# Patient Record
Sex: MALE | Race: WHITE | NOT HISPANIC OR LATINO
[De-identification: names, ages, dates, MRNs, and addresses within clinical notes are randomized per-mention and may not be internally consistent; named-entity substitution may affect disease eponyms.]

---

## 2016-11-15 LAB
AFP-TM SERPL-MCNC: <2 NG/ML (ref 0–8)
HCG SERPL-ACNC: <2 MUNITS/ML (ref 0–5)

## 2017-01-16 ENCOUNTER — CHARTING TRANS (OUTPATIENT)
Dept: OTHER | Age: 27
End: 2017-01-16

## 2017-01-16 ENCOUNTER — IMAGING SERVICES (OUTPATIENT)
Dept: OTHER | Age: 27
End: 2017-01-16

## 2017-01-16 ENCOUNTER — LAB SERVICES (OUTPATIENT)
Dept: OTHER | Age: 27
End: 2017-01-16

## 2017-01-17 ENCOUNTER — CHARTING TRANS (OUTPATIENT)
Dept: OTHER | Age: 27
End: 2017-01-17

## 2017-01-17 LAB
AFP-TM SERPL-MCNC: <2 NG/ML (ref 0–9)
HCG SERPL-ACNC: <2 MUNITS/ML (ref 0–5)

## 2017-02-13 ENCOUNTER — LAB SERVICES (OUTPATIENT)
Dept: OTHER | Age: 27
End: 2017-02-13

## 2017-02-13 ENCOUNTER — CHARTING TRANS (OUTPATIENT)
Dept: OTHER | Age: 27
End: 2017-02-13

## 2017-02-13 ENCOUNTER — IMAGING SERVICES (OUTPATIENT)
Dept: OTHER | Age: 27
End: 2017-02-13

## 2017-02-14 LAB
AFP-TM SERPL-MCNC: 2 NG/ML (ref 0–9)
HCG SERPL-ACNC: <2 MUNITS/ML

## 2017-03-13 ENCOUNTER — IMAGING SERVICES (OUTPATIENT)
Dept: OTHER | Age: 27
End: 2017-03-13

## 2017-03-13 ENCOUNTER — CHARTING TRANS (OUTPATIENT)
Dept: OTHER | Age: 27
End: 2017-03-13

## 2017-03-13 ENCOUNTER — HOSPITAL (OUTPATIENT)
Dept: OTHER | Age: 27
End: 2017-03-13
Attending: FAMILY MEDICINE

## 2017-03-13 ENCOUNTER — LAB SERVICES (OUTPATIENT)
Dept: OTHER | Age: 27
End: 2017-03-13

## 2017-03-14 LAB
AFP-TM SERPL-MCNC: <2 NG/ML (ref 0–9)
ANION GAP SERPL CALC-SCNC: 16 MMOL/L (ref 10–20)
BUN SERPL-MCNC: 12 MG/DL (ref 6–20)
BUN/CREAT SERPL: 13 (ref 7–25)
CALCIUM SERPL-MCNC: 9.5 MG/DL (ref 8.4–10.2)
CHLORIDE SERPL-SCNC: 103 MMOL/L (ref 98–107)
CO2 SERPL-SCNC: 25 MMOL/L (ref 21–32)
CREAT SERPL-MCNC: 0.93 MG/DL (ref 0.67–1.17)
GLUCOSE SERPL-MCNC: 124 MG/DL (ref 65–99)
HCG SERPL-ACNC: <2 MUNITS/ML
LENGTH OF FAST TIME PATIENT: 12 HRS
POTASSIUM SERPL-SCNC: 4.2 MMOL/L (ref 3.4–5.1)
SODIUM SERPL-SCNC: 140 MMOL/L (ref 135–145)

## 2017-04-03 ENCOUNTER — CHARTING TRANS (OUTPATIENT)
Dept: OTHER | Age: 27
End: 2017-04-03

## 2017-04-10 ENCOUNTER — IMAGING SERVICES (OUTPATIENT)
Dept: OTHER | Age: 27
End: 2017-04-10

## 2017-04-10 ENCOUNTER — CHARTING TRANS (OUTPATIENT)
Dept: OTHER | Age: 27
End: 2017-04-10

## 2017-04-10 ENCOUNTER — LAB SERVICES (OUTPATIENT)
Dept: OTHER | Age: 27
End: 2017-04-10

## 2017-04-11 LAB
AFP-TM SERPL-MCNC: <2 NG/ML (ref 0–9)
HCG SERPL-ACNC: <2 MUNITS/ML

## 2017-05-15 ENCOUNTER — LAB SERVICES (OUTPATIENT)
Dept: OTHER | Age: 27
End: 2017-05-15

## 2017-05-15 ENCOUNTER — CHARTING TRANS (OUTPATIENT)
Dept: OTHER | Age: 27
End: 2017-05-15

## 2017-05-15 ENCOUNTER — IMAGING SERVICES (OUTPATIENT)
Dept: OTHER | Age: 27
End: 2017-05-15

## 2017-05-16 LAB
AFP-TM SERPL-MCNC: 2 NG/ML (ref 0–9)
HCG SERPL-ACNC: <2 MUNITS/ML (ref 0–5)

## 2017-06-19 ENCOUNTER — IMAGING SERVICES (OUTPATIENT)
Dept: OTHER | Age: 27
End: 2017-06-19

## 2017-06-19 ENCOUNTER — CHARTING TRANS (OUTPATIENT)
Dept: OTHER | Age: 27
End: 2017-06-19

## 2017-06-19 ENCOUNTER — LAB SERVICES (OUTPATIENT)
Dept: OTHER | Age: 27
End: 2017-06-19

## 2017-06-20 LAB
AFP-TM SERPL-MCNC: 2 NG/ML (ref 0–9)
HCG SERPL-ACNC: <2 MUNITS/ML (ref 0–5)

## 2017-07-24 ENCOUNTER — LAB SERVICES (OUTPATIENT)
Dept: OTHER | Age: 27
End: 2017-07-24

## 2017-07-24 ENCOUNTER — CHARTING TRANS (OUTPATIENT)
Dept: OTHER | Age: 27
End: 2017-07-24

## 2017-07-25 ENCOUNTER — CHARTING TRANS (OUTPATIENT)
Dept: OTHER | Age: 27
End: 2017-07-25

## 2017-07-25 LAB
AFP-TM SERPL-MCNC: 2 NG/ML (ref 0–9)
ALBUMIN SERPL-MCNC: 4.3 G/DL (ref 3.6–5.1)
ALBUMIN/GLOB SERPL: 1.3 (ref 1–2.4)
ALP SERPL-CCNC: 80 UNITS/L (ref 45–117)
ALT SERPL-CCNC: 64 UNITS/L
ANION GAP SERPL CALC-SCNC: 16 MMOL/L (ref 10–20)
AST SERPL-CCNC: 22 UNITS/L
BASOPHILS # BLD: 0.1 K/MCL (ref 0–0.3)
BASOPHILS NFR BLD: 1 %
BILIRUB SERPL-MCNC: 0.6 MG/DL (ref 0.2–1)
BUN SERPL-MCNC: 8 MG/DL (ref 6–20)
BUN/CREAT SERPL: 9 (ref 7–25)
CALCIUM SERPL-MCNC: 9.4 MG/DL (ref 8.4–10.2)
CHLORIDE SERPL-SCNC: 106 MMOL/L (ref 98–107)
CHOLEST SERPL-MCNC: 145 MG/DL
CHOLEST/HDLC SERPL: 4.3
CO2 SERPL-SCNC: 23 MMOL/L (ref 21–32)
CREAT SERPL-MCNC: 0.86 MG/DL (ref 0.67–1.17)
DIFFERENTIAL METHOD BLD: ABNORMAL
EOSINOPHIL # BLD: 0.4 K/MCL (ref 0.1–0.5)
EOSINOPHIL NFR BLD: 4 %
ERYTHROCYTE [DISTWIDTH] IN BLOOD: 12.9 % (ref 11–15)
GLOBULIN SER-MCNC: 3.3 G/DL (ref 2–4)
GLUCOSE SERPL-MCNC: 85 MG/DL (ref 65–99)
HCG SERPL-ACNC: <2 MUNITS/ML (ref 0–5)
HDLC SERPL-MCNC: 34 MG/DL
HEMATOCRIT: 47.9 % (ref 39–51)
HEMOGLOBIN: 16.5 G/DL (ref 13–17)
LDLC SERPL CALC-MCNC: 45 MG/DL
LENGTH OF FAST TIME PATIENT: 12 HRS
LENGTH OF FAST TIME PATIENT: 12 HRS
LYMPHOCYTES # BLD: 3.3 K/MCL (ref 1–4.8)
LYMPHOCYTES NFR BLD: 35 %
MEAN CORPUSCULAR HEMOGLOBIN: 29.9 PG (ref 26–34)
MEAN CORPUSCULAR HGB CONC: 34.4 G/DL (ref 32–36.5)
MEAN CORPUSCULAR VOLUME: 86.8 FL (ref 78–100)
MONOCYTES # BLD: 0.9 K/MCL (ref 0.3–0.9)
MONOCYTES NFR BLD: 10 %
NEUTROPHILS # BLD: 4.6 K/MCL (ref 1.8–7.7)
NEUTROPHILS NFR BLD: 50 %
NONHDLC SERPL-MCNC: 111 MG/DL
PLATELET COUNT: 301 K/MCL (ref 140–450)
POTASSIUM SERPL-SCNC: 4.5 MMOL/L (ref 3.4–5.1)
RED CELL COUNT: 5.52 MIL/MCL (ref 4.5–5.9)
SODIUM SERPL-SCNC: 140 MMOL/L (ref 135–145)
TOTAL PROTEIN: 7.6 G/DL (ref 6.4–8.2)
TRIGL SERPL-MCNC: 331 MG/DL
TSH SERPL-ACNC: 1.47 MCUNITS/ML (ref 0.35–5)
WHITE BLOOD COUNT: 9.2 K/MCL (ref 4.2–11)

## 2017-08-30 ENCOUNTER — IMAGING SERVICES (OUTPATIENT)
Dept: OTHER | Age: 27
End: 2017-08-30

## 2017-08-30 ENCOUNTER — HOSPITAL (OUTPATIENT)
Dept: OTHER | Age: 27
End: 2017-08-30
Attending: FAMILY MEDICINE

## 2017-09-08 ENCOUNTER — LAB SERVICES (OUTPATIENT)
Dept: OTHER | Age: 27
End: 2017-09-08

## 2017-09-08 ENCOUNTER — IMAGING SERVICES (OUTPATIENT)
Dept: OTHER | Age: 27
End: 2017-09-08

## 2017-09-08 ENCOUNTER — CHARTING TRANS (OUTPATIENT)
Dept: OTHER | Age: 27
End: 2017-09-08

## 2017-09-11 LAB
AFP-TM SERPL-MCNC: <2 NG/ML (ref 0–9)
HCG SERPL-ACNC: <2 MUNITS/ML (ref 0–5)

## 2017-11-06 ENCOUNTER — LAB SERVICES (OUTPATIENT)
Dept: OTHER | Age: 27
End: 2017-11-06

## 2017-11-06 ENCOUNTER — CHARTING TRANS (OUTPATIENT)
Dept: OTHER | Age: 27
End: 2017-11-06

## 2017-11-06 ENCOUNTER — IMAGING SERVICES (OUTPATIENT)
Dept: OTHER | Age: 27
End: 2017-11-06

## 2017-11-07 LAB — AFP-TM SERPL-MCNC: 3 NG/ML (ref 0–9)

## 2018-06-06 ENCOUNTER — CHARTING TRANS (OUTPATIENT)
Dept: OTHER | Age: 28
End: 2018-06-06

## 2018-07-26 ENCOUNTER — CHARTING TRANS (OUTPATIENT)
Dept: OTHER | Age: 28
End: 2018-07-26

## 2018-10-12 ENCOUNTER — IMAGING SERVICES (OUTPATIENT)
Dept: OTHER | Age: 28
End: 2018-10-12

## 2018-10-12 ENCOUNTER — LAB SERVICES (OUTPATIENT)
Dept: OTHER | Age: 28
End: 2018-10-12

## 2018-10-12 ENCOUNTER — CHARTING TRANS (OUTPATIENT)
Dept: OTHER | Age: 28
End: 2018-10-12

## 2018-10-15 LAB
AFP-TM SERPL-MCNC: <2 NG/ML (ref 0–9)
HCG SERPL-ACNC: <2 MUNITS/ML

## 2018-10-27 ENCOUNTER — IMAGING SERVICES (OUTPATIENT)
Dept: OTHER | Age: 28
End: 2018-10-27

## 2018-10-27 ENCOUNTER — HOSPITAL (OUTPATIENT)
Dept: OTHER | Age: 28
End: 2018-10-27
Attending: FAMILY MEDICINE

## 2018-10-31 VITALS
HEART RATE: 80 BPM | HEIGHT: 68 IN | BODY MASS INDEX: 38.76 KG/M2 | TEMPERATURE: 98.4 F | WEIGHT: 255.74 LBS | RESPIRATION RATE: 20 BRPM

## 2018-11-01 VITALS
BODY MASS INDEX: 38.84 KG/M2 | HEIGHT: 68 IN | WEIGHT: 256.25 LBS | TEMPERATURE: 97.8 F | DIASTOLIC BLOOD PRESSURE: 87 MMHG | SYSTOLIC BLOOD PRESSURE: 134 MMHG | HEART RATE: 95 BPM

## 2018-11-02 VITALS
RESPIRATION RATE: 18 BRPM | OXYGEN SATURATION: 100 % | WEIGHT: 249 LBS | TEMPERATURE: 97.5 F | DIASTOLIC BLOOD PRESSURE: 82 MMHG | BODY MASS INDEX: 37.74 KG/M2 | HEIGHT: 68 IN | HEART RATE: 73 BPM | SYSTOLIC BLOOD PRESSURE: 122 MMHG

## 2018-11-02 VITALS
TEMPERATURE: 97.8 F | OXYGEN SATURATION: 97 % | RESPIRATION RATE: 18 BRPM | WEIGHT: 244 LBS | DIASTOLIC BLOOD PRESSURE: 82 MMHG | HEIGHT: 68 IN | BODY MASS INDEX: 36.98 KG/M2 | SYSTOLIC BLOOD PRESSURE: 130 MMHG | HEART RATE: 101 BPM

## 2018-11-03 VITALS
WEIGHT: 244 LBS | TEMPERATURE: 98.1 F | BODY MASS INDEX: 36.98 KG/M2 | OXYGEN SATURATION: 99 % | RESPIRATION RATE: 18 BRPM | HEIGHT: 68 IN | SYSTOLIC BLOOD PRESSURE: 134 MMHG | HEART RATE: 99 BPM | DIASTOLIC BLOOD PRESSURE: 86 MMHG

## 2018-11-03 VITALS
HEART RATE: 83 BPM | RESPIRATION RATE: 18 BRPM | DIASTOLIC BLOOD PRESSURE: 80 MMHG | BODY MASS INDEX: 37.69 KG/M2 | HEIGHT: 68 IN | OXYGEN SATURATION: 96 % | WEIGHT: 248.68 LBS | SYSTOLIC BLOOD PRESSURE: 128 MMHG | TEMPERATURE: 98.1 F

## 2018-11-03 VITALS
RESPIRATION RATE: 18 BRPM | SYSTOLIC BLOOD PRESSURE: 124 MMHG | HEART RATE: 104 BPM | HEIGHT: 68 IN | DIASTOLIC BLOOD PRESSURE: 72 MMHG | TEMPERATURE: 98.6 F | BODY MASS INDEX: 36.89 KG/M2 | WEIGHT: 243.39 LBS | OXYGEN SATURATION: 96 %

## 2018-11-03 VITALS
OXYGEN SATURATION: 97 % | SYSTOLIC BLOOD PRESSURE: 132 MMHG | TEMPERATURE: 98.2 F | DIASTOLIC BLOOD PRESSURE: 90 MMHG | BODY MASS INDEX: 37.74 KG/M2 | WEIGHT: 249 LBS | HEART RATE: 90 BPM | RESPIRATION RATE: 18 BRPM | HEIGHT: 68 IN

## 2018-11-04 VITALS
BODY MASS INDEX: 37.79 KG/M2 | DIASTOLIC BLOOD PRESSURE: 80 MMHG | HEIGHT: 68 IN | HEART RATE: 111 BPM | WEIGHT: 249.34 LBS | RESPIRATION RATE: 18 BRPM | OXYGEN SATURATION: 97 % | TEMPERATURE: 98.2 F | SYSTOLIC BLOOD PRESSURE: 128 MMHG

## 2018-11-05 VITALS
HEIGHT: 68 IN | SYSTOLIC BLOOD PRESSURE: 122 MMHG | TEMPERATURE: 99 F | DIASTOLIC BLOOD PRESSURE: 82 MMHG | HEART RATE: 125 BPM | WEIGHT: 238 LBS | OXYGEN SATURATION: 100 % | BODY MASS INDEX: 36.07 KG/M2 | RESPIRATION RATE: 18 BRPM

## 2018-11-05 VITALS
OXYGEN SATURATION: 98 % | WEIGHT: 245.59 LBS | TEMPERATURE: 98.5 F | BODY MASS INDEX: 37.22 KG/M2 | SYSTOLIC BLOOD PRESSURE: 130 MMHG | RESPIRATION RATE: 18 BRPM | HEIGHT: 68 IN | DIASTOLIC BLOOD PRESSURE: 88 MMHG | HEART RATE: 103 BPM

## 2018-11-05 VITALS
SYSTOLIC BLOOD PRESSURE: 122 MMHG | WEIGHT: 247.8 LBS | HEART RATE: 105 BPM | RESPIRATION RATE: 18 BRPM | DIASTOLIC BLOOD PRESSURE: 80 MMHG | OXYGEN SATURATION: 97 % | BODY MASS INDEX: 37.56 KG/M2 | HEIGHT: 68 IN | TEMPERATURE: 97.2 F

## 2018-11-27 VITALS
HEART RATE: 76 BPM | HEIGHT: 68 IN | RESPIRATION RATE: 17 BRPM | DIASTOLIC BLOOD PRESSURE: 82 MMHG | BODY MASS INDEX: 39.36 KG/M2 | OXYGEN SATURATION: 100 % | WEIGHT: 259.7 LBS | SYSTOLIC BLOOD PRESSURE: 136 MMHG | TEMPERATURE: 98.1 F

## 2019-01-09 ENCOUNTER — IMAGING SERVICES (OUTPATIENT)
Dept: GENERAL RADIOLOGY | Age: 29
End: 2019-01-09

## 2019-01-09 ENCOUNTER — OFFICE VISIT (OUTPATIENT)
Dept: FAMILY MEDICINE | Age: 29
End: 2019-01-09

## 2019-01-09 ENCOUNTER — HOSPITAL (OUTPATIENT)
Dept: OTHER | Age: 29
End: 2019-01-09

## 2019-01-09 VITALS
WEIGHT: 263 LBS | HEART RATE: 82 BPM | OXYGEN SATURATION: 98 % | SYSTOLIC BLOOD PRESSURE: 150 MMHG | DIASTOLIC BLOOD PRESSURE: 89 MMHG | HEIGHT: 68 IN | RESPIRATION RATE: 16 BRPM | BODY MASS INDEX: 39.86 KG/M2 | TEMPERATURE: 97.9 F

## 2019-01-09 DIAGNOSIS — R51.9 PERSISTENT HEADACHES: ICD-10-CM

## 2019-01-09 DIAGNOSIS — S60.221A CONTUSION OF RIGHT HAND, INITIAL ENCOUNTER: Primary | ICD-10-CM

## 2019-01-09 DIAGNOSIS — G47.10 HYPERSOMNOLENCE: ICD-10-CM

## 2019-01-09 DIAGNOSIS — E78.5 DYSLIPIDEMIA: ICD-10-CM

## 2019-01-09 DIAGNOSIS — C62.02 MALIGNANT NEOPLASM OF UNDESCENDED LEFT TESTIS (CMD): ICD-10-CM

## 2019-01-09 PROCEDURE — 99214 OFFICE O/P EST MOD 30 MIN: CPT | Performed by: FAMILY MEDICINE

## 2019-01-09 PROCEDURE — 73130 X-RAY EXAM OF HAND: CPT | Performed by: EMERGENCY MEDICINE

## 2019-01-09 RX ORDER — OCTISALATE, AVOBENZONE, HOMOSALATE, AND OCTOCRYLENE 29.4; 29.4; 49; 25.48 MG/ML; MG/ML; MG/ML; MG/ML
LOTION TOPICAL DAILY
COMMUNITY
Start: 2018-10-12 | End: 2019-08-21 | Stop reason: ALTCHOICE

## 2019-01-09 SDOH — HEALTH STABILITY: PHYSICAL HEALTH: ON AVERAGE, HOW MANY DAYS PER WEEK DO YOU ENGAGE IN MODERATE TO STRENUOUS EXERCISE (LIKE A BRISK WALK)?: 7 DAYS

## 2019-01-09 SDOH — HEALTH STABILITY: MENTAL HEALTH
STRESS IS WHEN SOMEONE FEELS TENSE, NERVOUS, ANXIOUS, OR CAN'T SLEEP AT NIGHT BECAUSE THEIR MIND IS TROUBLED. HOW STRESSED ARE YOU?: ONLY A LITTLE

## 2019-01-09 SDOH — HEALTH STABILITY: MENTAL HEALTH: HOW OFTEN DO YOU HAVE A DRINK CONTAINING ALCOHOL?: NEVER

## 2019-01-09 SDOH — HEALTH STABILITY: PHYSICAL HEALTH: ON AVERAGE, HOW MANY MINUTES DO YOU ENGAGE IN EXERCISE AT THIS LEVEL?: 30 MIN

## 2019-01-09 ASSESSMENT — ENCOUNTER SYMPTOMS
NUMBNESS: 0
TROUBLE SWALLOWING: 0
HEADACHES: 1
LIGHT-HEADEDNESS: 0
APPETITE CHANGE: 0
CHEST TIGHTNESS: 0
WEAKNESS: 0
POLYPHAGIA: 0
SHORTNESS OF BREATH: 0
FATIGUE: 0
ADENOPATHY: 0
EYE REDNESS: 0
BRUISES/BLEEDS EASILY: 0
DIARRHEA: 0
ABDOMINAL PAIN: 0
COUGH: 0
NAUSEA: 0
DIZZINESS: 0
SINUS PAIN: 0
EYE ITCHING: 0
BLOOD IN STOOL: 0
ACTIVITY CHANGE: 0
BACK PAIN: 0
WHEEZING: 0
WOUND: 0
SINUS PRESSURE: 0
SLEEP DISTURBANCE: 0
FEVER: 0
TREMORS: 0
CONSTIPATION: 0
POLYDIPSIA: 0
CHILLS: 0
VOMITING: 0
EYE PAIN: 0
PHOTOPHOBIA: 0
NERVOUS/ANXIOUS: 0
HALLUCINATIONS: 0
SORE THROAT: 0
EYE DISCHARGE: 0
VOICE CHANGE: 0
DIAPHORESIS: 0
UNEXPECTED WEIGHT CHANGE: 0

## 2019-01-09 ASSESSMENT — PATIENT HEALTH QUESTIONNAIRE - PHQ9
2. FEELING DOWN, DEPRESSED OR HOPELESS: NOT AT ALL
SUM OF ALL RESPONSES TO PHQ9 QUESTIONS 1 AND 2: 0
1. LITTLE INTEREST OR PLEASURE IN DOING THINGS: NOT AT ALL

## 2019-02-01 ENCOUNTER — HOSPITAL (OUTPATIENT)
Dept: OTHER | Age: 29
End: 2019-02-01

## 2019-02-11 ENCOUNTER — HOSPITAL (OUTPATIENT)
Dept: OTHER | Age: 29
End: 2019-02-11

## 2019-02-18 ENCOUNTER — OFFICE VISIT (OUTPATIENT)
Dept: SLEEP MEDICINE | Age: 29
End: 2019-02-18

## 2019-02-18 ENCOUNTER — TELEPHONE (OUTPATIENT)
Dept: SCHEDULING | Age: 29
End: 2019-02-18

## 2019-02-18 DIAGNOSIS — G47.33 OSA (OBSTRUCTIVE SLEEP APNEA): ICD-10-CM

## 2019-02-18 PROCEDURE — 95806 SLEEP STUDY UNATT&RESP EFFT: CPT | Performed by: INTERNAL MEDICINE

## 2019-03-02 ENCOUNTER — TELEPHONE (OUTPATIENT)
Dept: FAMILY MEDICINE | Age: 29
End: 2019-03-02

## 2019-03-08 ENCOUNTER — OFFICE VISIT (OUTPATIENT)
Dept: SLEEP MEDICINE | Age: 29
End: 2019-03-08

## 2019-03-08 DIAGNOSIS — G47.33 OSA (OBSTRUCTIVE SLEEP APNEA): ICD-10-CM

## 2019-03-08 PROCEDURE — 95811 POLYSOM 6/>YRS CPAP 4/> PARM: CPT | Performed by: INTERNAL MEDICINE

## 2019-03-13 DIAGNOSIS — G47.30 SLEEP APNEA, UNSPECIFIED TYPE: Primary | ICD-10-CM

## 2019-04-10 ENCOUNTER — OFFICE VISIT (OUTPATIENT)
Dept: FAMILY MEDICINE | Age: 29
End: 2019-04-10

## 2019-04-10 VITALS
SYSTOLIC BLOOD PRESSURE: 154 MMHG | DIASTOLIC BLOOD PRESSURE: 94 MMHG | BODY MASS INDEX: 39.71 KG/M2 | OXYGEN SATURATION: 96 % | TEMPERATURE: 98.3 F | HEART RATE: 104 BPM | RESPIRATION RATE: 16 BRPM | WEIGHT: 262 LBS | HEIGHT: 68 IN

## 2019-04-10 DIAGNOSIS — B36.0 TINEA VERSICOLOR: ICD-10-CM

## 2019-04-10 DIAGNOSIS — G47.33 OBSTRUCTIVE SLEEP APNEA: ICD-10-CM

## 2019-04-10 DIAGNOSIS — Z00.01 ENCOUNTER FOR GENERAL ADULT MEDICAL EXAMINATION WITH ABNORMAL FINDINGS: ICD-10-CM

## 2019-04-10 DIAGNOSIS — C62.92 NON-SEMINOMATOUS TESTICULAR CANCER, LEFT (CMD): Primary | ICD-10-CM

## 2019-04-10 PROCEDURE — 99214 OFFICE O/P EST MOD 30 MIN: CPT | Performed by: FAMILY MEDICINE

## 2019-04-10 RX ORDER — KETOCONAZOLE 200 MG/1
400 TABLET ORAL DAILY
Qty: 2 TABLET | Refills: 1 | Status: SHIPPED | OUTPATIENT
Start: 2019-04-10 | End: 2019-08-21 | Stop reason: ALTCHOICE

## 2019-04-10 ASSESSMENT — ENCOUNTER SYMPTOMS
NAUSEA: 0
DIZZINESS: 0
EYE ITCHING: 0
BACK PAIN: 0
SINUS PRESSURE: 0
WOUND: 0
TROUBLE SWALLOWING: 0
SINUS PAIN: 0
WHEEZING: 0
ADENOPATHY: 0
SORE THROAT: 0
APPETITE CHANGE: 0
CHILLS: 0
VOMITING: 0
POLYDIPSIA: 0
HEADACHES: 1
SHORTNESS OF BREATH: 0
EYE REDNESS: 0
BRUISES/BLEEDS EASILY: 0
DIARRHEA: 0
UNEXPECTED WEIGHT CHANGE: 0
NERVOUS/ANXIOUS: 0
COUGH: 0
NUMBNESS: 0
HALLUCINATIONS: 0
CONSTIPATION: 0
DIAPHORESIS: 0
VOICE CHANGE: 0
FEVER: 0
BLOOD IN STOOL: 0
ABDOMINAL PAIN: 0
PHOTOPHOBIA: 0
POLYPHAGIA: 0
CHEST TIGHTNESS: 0
FATIGUE: 0
EYE DISCHARGE: 0
WEAKNESS: 0
ACTIVITY CHANGE: 0
EYE PAIN: 0
SLEEP DISTURBANCE: 1
TREMORS: 0
ROS SKIN COMMENTS: TINEA VERSICOLOR
LIGHT-HEADEDNESS: 0

## 2019-05-18 ENCOUNTER — LAB SERVICES (OUTPATIENT)
Dept: LAB | Age: 29
End: 2019-05-18

## 2019-05-18 DIAGNOSIS — K52.9 CHRONIC DIARRHEA: ICD-10-CM

## 2019-05-18 DIAGNOSIS — C62.92 NON-SEMINOMATOUS TESTICULAR CANCER, LEFT (CMD): ICD-10-CM

## 2019-05-18 DIAGNOSIS — R19.8 RECTAL TENESMUS: ICD-10-CM

## 2019-05-18 DIAGNOSIS — Z00.01 ENCOUNTER FOR GENERAL ADULT MEDICAL EXAMINATION WITH ABNORMAL FINDINGS: ICD-10-CM

## 2019-05-18 LAB
ALBUMIN SERPL-MCNC: 4.6 G/DL (ref 3.6–5.1)
ALBUMIN/GLOB SERPL: 1.5 {RATIO} (ref 1–2.4)
ALP SERPL-CCNC: 81 UNITS/L (ref 45–117)
ALT SERPL-CCNC: 63 UNITS/L
ANION GAP SERPL CALC-SCNC: 13 MMOL/L (ref 10–20)
AST SERPL-CCNC: 27 UNITS/L
BILIRUB SERPL-MCNC: 0.7 MG/DL (ref 0.2–1)
BUN SERPL-MCNC: 11 MG/DL (ref 6–20)
BUN/CREAT SERPL: 12 (ref 7–25)
CALCIUM SERPL-MCNC: 9.6 MG/DL (ref 8.4–10.2)
CHLORIDE SERPL-SCNC: 105 MMOL/L (ref 98–107)
CHOLEST SERPL-MCNC: 134 MG/DL
CHOLEST/HDLC SERPL: 3.4 {RATIO}
CO2 SERPL-SCNC: 26 MMOL/L (ref 21–32)
CREAT SERPL-MCNC: 0.91 MG/DL (ref 0.67–1.17)
FASTING STATUS PATIENT QL REPORTED: 12 HRS
GLOBULIN SER-MCNC: 3 G/DL (ref 2–4)
GLUCOSE SERPL-MCNC: 82 MG/DL (ref 65–99)
HCG SERPL-ACNC: <2 MUNITS/ML (ref 0–5)
HDLC SERPL-MCNC: 39 MG/DL
LDH SERPL L TO P-CCNC: 194 UNITS/L (ref 86–234)
LDLC SERPL-MCNC: 43 MG/DL
LENGTH OF FAST TIME PATIENT: 12 HRS
NONHDLC SERPL-MCNC: 95 MG/DL
POTASSIUM SERPL-SCNC: 4.4 MMOL/L (ref 3.4–5.1)
PROT SERPL-MCNC: 7.6 G/DL (ref 6.4–8.2)
SODIUM SERPL-SCNC: 140 MMOL/L (ref 135–145)
TRIGL SERPL-MCNC: 258 MG/DL

## 2019-05-18 PROCEDURE — 80061 LIPID PANEL: CPT | Performed by: FAMILY MEDICINE

## 2019-05-18 PROCEDURE — 36415 COLL VENOUS BLD VENIPUNCTURE: CPT | Performed by: FAMILY MEDICINE

## 2019-05-18 PROCEDURE — 83615 LACTATE (LD) (LDH) ENZYME: CPT | Performed by: FAMILY MEDICINE

## 2019-05-18 PROCEDURE — 80053 COMPREHEN METABOLIC PANEL: CPT | Performed by: FAMILY MEDICINE

## 2019-05-18 PROCEDURE — 84702 CHORIONIC GONADOTROPIN TEST: CPT | Performed by: FAMILY MEDICINE

## 2019-05-18 PROCEDURE — 82105 ALPHA-FETOPROTEIN SERUM: CPT | Performed by: FAMILY MEDICINE

## 2019-05-19 LAB — AFP-TM SERPL-MCNC: <2 NG/ML (ref 0–9)

## 2019-06-25 ENCOUNTER — TELEPHONE (OUTPATIENT)
Dept: RHEUMATOLOGY | Age: 29
End: 2019-06-25

## 2019-06-25 DIAGNOSIS — B36.0 TINEA VERSICOLOR: Primary | ICD-10-CM

## 2019-06-27 ENCOUNTER — TELEPHONE (OUTPATIENT)
Dept: GASTROENTEROLOGY | Age: 29
End: 2019-06-27

## 2019-06-27 ENCOUNTER — OFFICE VISIT (OUTPATIENT)
Dept: GASTROENTEROLOGY | Age: 29
End: 2019-06-27

## 2019-06-27 VITALS
HEART RATE: 88 BPM | SYSTOLIC BLOOD PRESSURE: 137 MMHG | BODY MASS INDEX: 39.76 KG/M2 | DIASTOLIC BLOOD PRESSURE: 82 MMHG | WEIGHT: 262.35 LBS | TEMPERATURE: 99.1 F | HEIGHT: 68 IN

## 2019-06-27 DIAGNOSIS — R19.8 RECTAL TENESMUS: ICD-10-CM

## 2019-06-27 DIAGNOSIS — K52.9 CHRONIC DIARRHEA: Primary | ICD-10-CM

## 2019-06-27 PROCEDURE — 99204 OFFICE O/P NEW MOD 45 MIN: CPT | Performed by: NURSE PRACTITIONER

## 2019-06-27 RX ORDER — POLYETHYLENE GLYCOL 3350, SODIUM CHLORIDE, SODIUM BICARBONATE, POTASSIUM CHLORIDE 420; 11.2; 5.72; 1.48 G/4L; G/4L; G/4L; G/4L
4000 POWDER, FOR SOLUTION ORAL ONCE
Qty: 4000 ML | Refills: 0 | Status: SHIPPED | OUTPATIENT
Start: 2019-06-27 | End: 2019-06-27

## 2019-06-27 RX ORDER — BISACODYL 5 MG/1
TABLET, DELAYED RELEASE ORAL
Qty: 4 TABLET | Refills: 0 | Status: SHIPPED | OUTPATIENT
Start: 2019-06-27 | End: 2019-08-21 | Stop reason: ALTCHOICE

## 2019-06-27 SDOH — HEALTH STABILITY: MENTAL HEALTH: HOW OFTEN DO YOU HAVE A DRINK CONTAINING ALCOHOL?: NEVER

## 2019-07-09 PROCEDURE — 87493 C DIFF AMPLIFIED PROBE: CPT | Performed by: NURSE PRACTITIONER

## 2019-07-09 PROCEDURE — 87507 IADNA-DNA/RNA PROBE TQ 12-25: CPT | Performed by: NURSE PRACTITIONER

## 2019-07-09 PROCEDURE — 83520 IMMUNOASSAY QUANT NOS NONAB: CPT | Performed by: NURSE PRACTITIONER

## 2019-07-10 LAB
ADV 40+41 FIB PROT STL QL NAA+PROBE: NOT DETECTED
C COLI+JEJ+LAR 16S RRNA STL QL NAA+PROBE: NOT DETECTED
C DIFF DNA SPEC QL NAA+PROBE: NOT DETECTED
C PARVUM+HOMINIS COWP STL QL NAA+PROBE: NOT DETECTED
E HISTOLYTICA 18S RRNA STL QL NAA+PROBE: NOT DETECTED
EC O157 RFBE GENE STL QL NAA+PROBE: NOT DETECTED
EC STX1 GENE STL QL NAA+PROBE: NOT DETECTED
EC STX2 GENE STL QL NAA+PROBE: NOT DETECTED
ETEC ELTA+ESTB GENES STL QL NAA+PROBE: NOT DETECTED
G LAMBLIA 18S RRNA STL QL NAA+PROBE: NOT DETECTED
NOROVIRUS GI RNA STL QL NAA+PROBE: NOT DETECTED
NOROVIRUS GII RNA STL QL NAA+PROBE: NOT DETECTED
RVA VP6 STL QL NAA+PROBE: NOT DETECTED
SALMONELLA SP INVA+FLIC STL QL NAA+PROBE: NOT DETECTED
SHIGELLA SP+EIEC IPAH STL QL NAA+PROBE: NOT DETECTED
SPECIMEN SOURCE: NORMAL
VIBRIO CHOL TOXIN CTXA STL QL NAA+PROBE: NOT DETECTED

## 2019-07-13 LAB — ELASTASE PANC STL QL: >500

## 2019-07-24 ENCOUNTER — TELEPHONE (OUTPATIENT)
Dept: FAMILY MEDICINE | Age: 29
End: 2019-07-24

## 2019-07-24 RX ORDER — MELOXICAM 7.5 MG/1
7.5 TABLET ORAL 2 TIMES DAILY PRN
Qty: 60 TABLET | Refills: 0 | Status: SHIPPED | OUTPATIENT
Start: 2019-07-24 | End: 2019-08-21 | Stop reason: ALTCHOICE

## 2019-07-24 RX ORDER — CYCLOBENZAPRINE HCL 10 MG
10 TABLET ORAL 3 TIMES DAILY PRN
Qty: 30 TABLET | Refills: 0 | Status: SHIPPED | OUTPATIENT
Start: 2019-07-24 | End: 2019-08-21 | Stop reason: ALTCHOICE

## 2019-08-03 ENCOUNTER — E-ADVICE (OUTPATIENT)
Dept: GASTROENTEROLOGY | Age: 29
End: 2019-08-03

## 2019-08-06 ENCOUNTER — TELEPHONE (OUTPATIENT)
Dept: GASTROENTEROLOGY | Age: 29
End: 2019-08-06

## 2019-08-21 ENCOUNTER — OFFICE VISIT (OUTPATIENT)
Dept: FAMILY MEDICINE | Age: 29
End: 2019-08-21

## 2019-08-21 VITALS
HEIGHT: 69 IN | WEIGHT: 260.36 LBS | HEART RATE: 67 BPM | BODY MASS INDEX: 38.56 KG/M2 | TEMPERATURE: 98 F | SYSTOLIC BLOOD PRESSURE: 138 MMHG | RESPIRATION RATE: 16 BRPM | DIASTOLIC BLOOD PRESSURE: 82 MMHG

## 2019-08-21 DIAGNOSIS — R51.9 PERSISTENT HEADACHES: Primary | ICD-10-CM

## 2019-08-21 DIAGNOSIS — G47.33 OBSTRUCTIVE SLEEP APNEA: ICD-10-CM

## 2019-08-21 PROCEDURE — 99213 OFFICE O/P EST LOW 20 MIN: CPT | Performed by: FAMILY MEDICINE

## 2019-08-21 RX ORDER — NAPROXEN 500 MG/1
500 TABLET ORAL 2 TIMES DAILY
Qty: 60 TABLET | Refills: 1 | Status: SHIPPED | OUTPATIENT
Start: 2019-08-21 | End: 2020-01-13 | Stop reason: SDUPTHER

## 2019-08-21 SDOH — HEALTH STABILITY: MENTAL HEALTH: HOW OFTEN DO YOU HAVE A DRINK CONTAINING ALCOHOL?: NEVER

## 2019-08-21 ASSESSMENT — ENCOUNTER SYMPTOMS
ABDOMINAL DISTENTION: 0
CONSTIPATION: 0
CHILLS: 0
HEMATOLOGIC/LYMPHATIC NEGATIVE: 1
DIZZINESS: 0
HALLUCINATIONS: 0
CHOKING: 0
SORE THROAT: 0
APNEA: 0
FATIGUE: 0
LIGHT-HEADEDNESS: 0
SHORTNESS OF BREATH: 0
EYES NEGATIVE: 1
NUMBNESS: 0
SPEECH DIFFICULTY: 0
ABDOMINAL PAIN: 0
COUGH: 0
NAUSEA: 0
NERVOUS/ANXIOUS: 0
BLOOD IN STOOL: 0
RHINORRHEA: 0
SEIZURES: 0
WHEEZING: 0
DIARRHEA: 0
FEVER: 0
BACK PAIN: 0
FACIAL ASYMMETRY: 0
CHEST TIGHTNESS: 0
CONFUSION: 0
UNEXPECTED WEIGHT CHANGE: 0
HEADACHES: 1
SINUS PRESSURE: 0
ANAL BLEEDING: 0
SLEEP DISTURBANCE: 0
WEAKNESS: 0
TREMORS: 0
ENDOCRINE NEGATIVE: 1

## 2019-08-21 ASSESSMENT — PATIENT HEALTH QUESTIONNAIRE - PHQ9: 1. LITTLE INTEREST OR PLEASURE IN DOING THINGS: NOT AT ALL

## 2019-08-30 ENCOUNTER — TELEPHONE (OUTPATIENT)
Dept: GASTROENTEROLOGY | Age: 29
End: 2019-08-30

## 2019-09-11 ENCOUNTER — HOSPITAL (OUTPATIENT)
Dept: OTHER | Age: 29
End: 2019-09-11
Attending: INTERNAL MEDICINE

## 2019-09-25 ENCOUNTER — OFFICE VISIT (OUTPATIENT)
Dept: FAMILY MEDICINE | Age: 29
End: 2019-09-25

## 2019-09-25 VITALS
HEIGHT: 69 IN | BODY MASS INDEX: 39.1 KG/M2 | TEMPERATURE: 98.3 F | RESPIRATION RATE: 16 BRPM | HEART RATE: 80 BPM | DIASTOLIC BLOOD PRESSURE: 73 MMHG | WEIGHT: 264 LBS | OXYGEN SATURATION: 97 % | SYSTOLIC BLOOD PRESSURE: 142 MMHG

## 2019-09-25 DIAGNOSIS — G47.33 OBSTRUCTIVE SLEEP APNEA: ICD-10-CM

## 2019-09-25 DIAGNOSIS — C62.92 NON-SEMINOMATOUS TESTICULAR CANCER, LEFT (CMD): ICD-10-CM

## 2019-09-25 DIAGNOSIS — G44.89 CHRONIC MIXED HEADACHE SYNDROME: Primary | ICD-10-CM

## 2019-09-25 PROCEDURE — 90686 IIV4 VACC NO PRSV 0.5 ML IM: CPT

## 2019-09-25 PROCEDURE — 90471 IMMUNIZATION ADMIN: CPT

## 2019-09-25 PROCEDURE — 99214 OFFICE O/P EST MOD 30 MIN: CPT | Performed by: FAMILY MEDICINE

## 2019-09-25 RX ORDER — TOPIRAMATE 25 MG/1
25 TABLET ORAL AT BEDTIME
Qty: 30 TABLET | Refills: 5 | Status: SHIPPED | OUTPATIENT
Start: 2019-09-25 | End: 2020-01-13

## 2019-09-25 RX ORDER — CYCLOBENZAPRINE HCL 10 MG
10 TABLET ORAL AT BEDTIME
Qty: 30 TABLET | Refills: 3 | Status: SHIPPED | OUTPATIENT
Start: 2019-09-25 | End: 2020-01-13 | Stop reason: SDUPTHER

## 2019-09-27 ENCOUNTER — HOSPITAL (OUTPATIENT)
Dept: OTHER | Age: 29
End: 2019-09-27
Attending: FAMILY MEDICINE

## 2020-01-13 ENCOUNTER — OFFICE VISIT (OUTPATIENT)
Dept: FAMILY MEDICINE | Age: 30
End: 2020-01-13

## 2020-01-13 ENCOUNTER — TELEPHONE (OUTPATIENT)
Dept: SCHEDULING | Age: 30
End: 2020-01-13

## 2020-01-13 VITALS
OXYGEN SATURATION: 100 % | TEMPERATURE: 97.9 F | BODY MASS INDEX: 39.07 KG/M2 | RESPIRATION RATE: 16 BRPM | WEIGHT: 263.78 LBS | DIASTOLIC BLOOD PRESSURE: 86 MMHG | HEIGHT: 69 IN | SYSTOLIC BLOOD PRESSURE: 146 MMHG | HEART RATE: 87 BPM

## 2020-01-13 DIAGNOSIS — G44.89 CHRONIC MIXED HEADACHE SYNDROME: Primary | ICD-10-CM

## 2020-01-13 DIAGNOSIS — B36.0 TINEA VERSICOLOR: ICD-10-CM

## 2020-01-13 PROBLEM — R51.9 PERSISTENT HEADACHES: Status: RESOLVED | Noted: 2019-01-09 | Resolved: 2020-01-13

## 2020-01-13 PROCEDURE — 99214 OFFICE O/P EST MOD 30 MIN: CPT | Performed by: FAMILY MEDICINE

## 2020-01-13 RX ORDER — KETOCONAZOLE 200 MG/1
200 TABLET ORAL DAILY
Qty: 2 TABLET | Refills: 1 | Status: SHIPPED | OUTPATIENT
Start: 2020-01-13 | End: 2021-03-07 | Stop reason: SDUPTHER

## 2020-01-13 RX ORDER — CYCLOBENZAPRINE HCL 10 MG
10 TABLET ORAL AT BEDTIME
Qty: 30 TABLET | Refills: 11 | Status: SHIPPED | OUTPATIENT
Start: 2020-01-13 | End: 2021-03-07 | Stop reason: SDUPTHER

## 2020-01-13 RX ORDER — NAPROXEN 500 MG/1
500 TABLET ORAL 2 TIMES DAILY
Qty: 60 TABLET | Refills: 11 | Status: SHIPPED | OUTPATIENT
Start: 2020-01-13

## 2020-01-13 RX ORDER — TOPIRAMATE 50 MG/1
50 CAPSULE, EXTENDED RELEASE ORAL AT BEDTIME
Qty: 30 CAPSULE | Refills: 11 | Status: SHIPPED | OUTPATIENT
Start: 2020-01-13 | End: 2020-05-28

## 2020-01-13 ASSESSMENT — PATIENT HEALTH QUESTIONNAIRE - PHQ9
SUM OF ALL RESPONSES TO PHQ9 QUESTIONS 1 AND 2: 0
2. FEELING DOWN, DEPRESSED OR HOPELESS: NOT AT ALL
1. LITTLE INTEREST OR PLEASURE IN DOING THINGS: NOT AT ALL
SUM OF ALL RESPONSES TO PHQ9 QUESTIONS 1 AND 2: 0

## 2020-05-28 ENCOUNTER — TELEPHONE (OUTPATIENT)
Dept: FAMILY MEDICINE | Age: 30
End: 2020-05-28

## 2020-05-28 ENCOUNTER — TELEPHONE (OUTPATIENT)
Dept: GENERAL RADIOLOGY | Age: 30
End: 2020-05-28

## 2020-05-28 RX ORDER — TOPIRAMATE 100 MG/1
100 TABLET, FILM COATED ORAL AT BEDTIME
Qty: 90 TABLET | Refills: 3 | Status: SHIPPED | OUTPATIENT
Start: 2020-05-28 | End: 2020-09-16 | Stop reason: SDUPTHER

## 2020-09-16 ENCOUNTER — TELEPHONE (OUTPATIENT)
Dept: SCHEDULING | Age: 30
End: 2020-09-16

## 2020-09-16 RX ORDER — TOPIRAMATE 100 MG/1
100 TABLET, FILM COATED ORAL AT BEDTIME
Qty: 90 TABLET | Refills: 0 | Status: SHIPPED | OUTPATIENT
Start: 2020-09-16 | End: 2021-03-07 | Stop reason: SDUPTHER

## 2021-03-07 DIAGNOSIS — B36.0 TINEA VERSICOLOR: ICD-10-CM

## 2021-03-09 ENCOUNTER — TELEPHONE (OUTPATIENT)
Dept: SCHEDULING | Age: 31
End: 2021-03-09

## 2021-03-09 RX ORDER — TOPIRAMATE 100 MG/1
100 TABLET, FILM COATED ORAL AT BEDTIME
Qty: 90 TABLET | Refills: 0 | Status: SHIPPED | OUTPATIENT
Start: 2021-03-09

## 2021-03-09 RX ORDER — CYCLOBENZAPRINE HCL 10 MG
10 TABLET ORAL AT BEDTIME
Qty: 30 TABLET | Refills: 11 | Status: SHIPPED | OUTPATIENT
Start: 2021-03-09

## 2021-03-09 RX ORDER — KETOCONAZOLE 200 MG/1
200 TABLET ORAL DAILY
Qty: 2 TABLET | Refills: 1 | Status: SHIPPED | OUTPATIENT
Start: 2021-03-09 | End: 2022-01-26 | Stop reason: SDUPTHER

## 2021-12-15 DIAGNOSIS — B36.0 TINEA VERSICOLOR: ICD-10-CM

## 2021-12-15 RX ORDER — KETOCONAZOLE 200 MG/1
200 TABLET ORAL DAILY
Qty: 2 TABLET | Refills: 1 | OUTPATIENT
Start: 2021-12-15

## 2022-01-26 DIAGNOSIS — B36.0 TINEA VERSICOLOR: ICD-10-CM

## 2022-01-26 RX ORDER — KETOCONAZOLE 200 MG/1
200 TABLET ORAL DAILY
Qty: 2 TABLET | Refills: 1 | Status: SHIPPED | OUTPATIENT
Start: 2022-01-26

## 2022-08-25 DIAGNOSIS — B36.0 TINEA VERSICOLOR: ICD-10-CM

## 2022-08-25 RX ORDER — KETOCONAZOLE 200 MG/1
200 TABLET ORAL DAILY
Qty: 2 TABLET | Refills: 1 | OUTPATIENT
Start: 2022-08-25

## 2023-03-22 ENCOUNTER — TELEPHONE (OUTPATIENT)
Dept: NEUROLOGY | Facility: CLINIC | Age: 33
End: 2023-03-22

## 2023-03-22 ENCOUNTER — OFFICE VISIT (OUTPATIENT)
Dept: NEUROLOGY | Facility: CLINIC | Age: 33
End: 2023-03-22
Payer: COMMERCIAL

## 2023-03-22 VITALS
HEART RATE: 87 BPM | SYSTOLIC BLOOD PRESSURE: 157 MMHG | RESPIRATION RATE: 16 BRPM | WEIGHT: 272 LBS | HEIGHT: 67 IN | DIASTOLIC BLOOD PRESSURE: 73 MMHG | BODY MASS INDEX: 42.69 KG/M2

## 2023-03-22 DIAGNOSIS — G43.009 MIGRAINE WITHOUT AURA AND WITHOUT STATUS MIGRAINOSUS, NOT INTRACTABLE: Primary | ICD-10-CM

## 2023-03-22 DIAGNOSIS — R03.0 ELEVATED BLOOD PRESSURE READING: ICD-10-CM

## 2023-03-22 PROCEDURE — 3078F DIAST BP <80 MM HG: CPT | Performed by: PHYSICIAN ASSISTANT

## 2023-03-22 PROCEDURE — 99203 OFFICE O/P NEW LOW 30 MIN: CPT | Performed by: PHYSICIAN ASSISTANT

## 2023-03-22 PROCEDURE — 3008F BODY MASS INDEX DOCD: CPT | Performed by: PHYSICIAN ASSISTANT

## 2023-03-22 PROCEDURE — 3077F SYST BP >= 140 MM HG: CPT | Performed by: PHYSICIAN ASSISTANT

## 2023-03-22 RX ORDER — FREMANEZUMAB-VFRM 225 MG/1.5ML
225 INJECTION SUBCUTANEOUS
Qty: 1.5 ML | Refills: 5 | Status: SHIPPED | OUTPATIENT
Start: 2023-03-22 | End: 2024-03-21

## 2023-03-22 RX ORDER — ELETRIPTAN HYDROBROMIDE 40 MG/1
TABLET, FILM COATED ORAL
Qty: 10 TABLET | Refills: 2 | Status: SHIPPED | OUTPATIENT
Start: 2023-03-22

## 2023-03-22 NOTE — TELEPHONE ENCOUNTER
Patient had an office visit today and provided his FMLA paperwork to the provider. The provider completed the paperwork and endorsed to the RN. RN sent to the paperwork to the patient through My Chart. Paperwork sent to scan.

## 2023-03-24 ENCOUNTER — TELEPHONE (OUTPATIENT)
Dept: NEUROLOGY | Facility: CLINIC | Age: 33
End: 2023-03-24

## 2023-03-24 NOTE — TELEPHONE ENCOUNTER
PA requested for Ajovy  Clinical questions answered and submitted to insurance      Received fax from [address] received for patient use of [employer]   Approval granted: 2/22/23-3/23/24        Pt notified

## 2023-05-08 ENCOUNTER — MED REC SCAN ONLY (OUTPATIENT)
Dept: NEUROLOGY | Facility: CLINIC | Age: 33
End: 2023-05-08

## 2024-08-15 ENCOUNTER — TELEPHONE (OUTPATIENT)
Dept: NEUROLOGY | Facility: CLINIC | Age: 34
End: 2024-08-15

## 2024-08-15 ENCOUNTER — OFFICE VISIT (OUTPATIENT)
Dept: NEUROLOGY | Facility: CLINIC | Age: 34
End: 2024-08-15
Payer: COMMERCIAL

## 2024-08-15 VITALS
WEIGHT: 292 LBS | RESPIRATION RATE: 16 BRPM | DIASTOLIC BLOOD PRESSURE: 88 MMHG | SYSTOLIC BLOOD PRESSURE: 138 MMHG | BODY MASS INDEX: 46 KG/M2 | HEART RATE: 100 BPM

## 2024-08-15 DIAGNOSIS — G43.709 CHRONIC MIGRAINE W/O AURA W/O STATUS MIGRAINOSUS, NOT INTRACTABLE: Primary | ICD-10-CM

## 2024-08-15 DIAGNOSIS — G47.33 OSA (OBSTRUCTIVE SLEEP APNEA): ICD-10-CM

## 2024-08-15 PROCEDURE — 99215 OFFICE O/P EST HI 40 MIN: CPT | Performed by: OTHER

## 2024-08-15 RX ORDER — ATOGEPANT 60 MG/1
60 TABLET ORAL DAILY
Qty: 8 TABLET | Refills: 0 | COMMUNITY
Start: 2024-08-15

## 2024-08-15 RX ORDER — ATOGEPANT 60 MG/1
60 TABLET ORAL DAILY
Qty: 30 TABLET | Refills: 3 | Status: SHIPPED | OUTPATIENT
Start: 2024-08-15

## 2024-08-15 NOTE — PATIENT INSTRUCTIONS
Refill policies:    Allow 2-3 business days for refills; controlled substances may take longer.  Contact your pharmacy at least 5 days prior to running out of medication and have them send an electronic request or submit request through the “request refill” option in your [employer] account.  Refills are not addressed on weekends; covering physicians do not authorize routine medications on weekends.  No narcotics or controlled substances are refilled after noon on Fridays or by on call physicians.  By law, narcotics must be electronically prescribed.  A 30 day supply with no refills is the maximum allowed.  If your prescription is due for a refill, you may be due for a follow up appointment.  To best provide you care, patients receiving routine medications need to be seen at least once a year.  Patients receiving narcotic/controlled substance medications need to be seen at least once every 3 months.  In the event that your preferred pharmacy does not have the requested medication in stock (e.g. Backordered), it is your responsibility to find another pharmacy that has the requested medication available.  We will gladly send a new prescription to that pharmacy at your request.    Scheduling Tests:    If your physician has ordered radiology tests such as MRI or CT scans, please contact Central Scheduling at [phone] right away to schedule the test.  Once scheduled, the [facility] Centralized Referral Team will work with your insurance carrier to obtain pre-certification or prior authorization.  Depending on your insurance carrier, approval may take 3-10 days.  It is highly recommended patients assure they have received an authorization before having a test performed.  If test is done without insurance authorization, patient may be responsible for the entire amount billed.      Precertification and Prior Authorizations:  If your physician has recommended that you have a procedure or additional testing performed the [facility]  Centralized Referral Team will contact your insurance carrier to obtain pre-certification or prior authorization.    You are strongly encouraged to contact your insurance carrier to verify that your procedure/test has been approved and is a COVERED benefit.  Although the [facility] Centralized Referral Team does its due diligence, the insurance carrier gives the disclaimer that \"Although the procedure is authorized, this does not guarantee payment.\"    Ultimately the patient is responsible for payment.   Thank you for your understanding in this matter.  Paperwork Completion:  If you require FMLA or disability paperwork for your recovery, please make sure to either drop it off or have it faxed to our office at [phone]. Be sure the form has your name and date of birth on it.  The form will be faxed to our Forms Department and they will complete it for you.  There is a 25$ fee for all forms that need to be filled out.  Please be aware there is a 10-14 day turnaround time.  You will need to sign a release of information (JAMIE) form if your paperwork does not come with one.  You may call the Forms Department with any questions at [phone].  Their fax number is [phone].

## 2024-08-15 NOTE — PROGRESS NOTES
[facility] Neurology Outpatient Progress Note  Date of service: 8/15/2024    Assessment:     ICD-10-CM    1. Chronic migraine w/o aura w/o status migrainosus, not intractable  G43.709       2. SHONNA (obstructive sleep apnea)  G47.33           Plan:  Qulipta 60 mg (failed topamax, muscle relaxant, ajovy, triptan), sample given  Migraine education given  Medication overuse headache education given  Cut down coffee  Recommend follow up with sleep medicine,  Ok to fill FMLA form  See orders and medications filed with this encounter. The patient indicates understanding of these issues and agrees with the plan.  Discussed with patient/family regarding assessment,  care plan   RTC 3 months  Pt should go ER for any new or worsening symptoms and contact office   A total of 40 minutes were spent on patient care,  more than 50% was spent counseling patient regarding studies' results, assessment, treatment options and care plan.    Subjective:   History:  Patient here for a follow-up visit for uncontrolled migraine. Since last visit migraine has been worsening, saw another provider before.  Tried topamax, ajovy, triptan(which caused adverse effect).   Currently MMD 15 days.  Had MRI, MRA before, negative.  He  works as a  in [place].  History/Other:   REVIEW OF SYSTEMS:  A 10-point system was reviewed. Pertinent positives and negatives are noted as above       Current Outpatient Medications:     aspirin-acetaminophen-caffeine 250-250-65 MG Oral Tab, Take 1 tablet by mouth. 2-6x per week, Disp: , Rfl:     Atogepant (QULIPTA) 60 MG Oral Tab, Take 60 mg by mouth daily., Disp: 30 tablet, Rfl: 3    Eletriptan Hydrobromide (RELPAX) 40 MG Oral Tab, Take 1 tab po at onset of headache, may repeat in 2 hours if headache recurs, max 2 tabs in 24 hours (Patient not taking: Reported on 8/15/2024), Disp: 10 tablet, Rfl: 2  Allergies:  Allergies   Allergen Reactions    Penicillins RASH    Sulfa Antibiotics RASH    Seasonal OTHER (SEE  COMMENTS)     Sneezing, runny nose, cogestion     Past Medical History:    Migraines    Testicular cancer (HCC)    2017     Past Surgical History:   Procedure Laterality Date    Anesth,nose,sinus surgery      Part of the bridge of the nose removed(7 years old)    Orchiectomy   Left 2017    Tonsillectomy       Social History:  Social History     Tobacco Use    Smoking status: Former     Current packs/day: 0.00     Types: Cigarettes     Quit date: 2022     Years since quittin.7     Passive exposure: Never    Smokeless tobacco: Former   Substance Use Topics    Alcohol use: Yes     Comment: socially     Family History   Problem Relation Age of Onset    Migraines Mother     Diabetes Maternal Grandmother     Migraines Brother       Objective:   Neurological Examination:  /88   Pulse 100   Resp 16   Wt 292 lb (132.5 kg)   BMI 45.73 kg/m²   Mental status: A & O X 3  Language: no aphasia  Speech: no dysarthria  CN II-XII: intact   Motor strength: 5/5 all extremities  Tone: normal  DTRs: 2+ symmetric  Coordination: normal  Sensory: symmetric  Gait: normal    Test reviewed on 8/15/2024      Pat Caputo"Jensen\"MD Stu  Neurology  [facility]  8/15/2024, 9:57 AM  CC: Jorge Mack MD

## 2024-08-15 NOTE — TELEPHONE ENCOUNTER
Pt in office for consult with Dr. Peraza.     Per Dr. Peraza, pt was on Qulipta in the past and did well. Would like to restart at chronic migraine dosage of 60mg     Pt has been on Topiramate, Cyclobenzaprine, Ajovy, Sumatriptan, Eletriptan in the past.     Submitted prior authorization through [employer].     Nursing will await outcome.

## 2024-08-15 NOTE — TELEPHONE ENCOUNTER
Rec'd [facility] paperwork for completion. Emailed to Forms dept, sending originals through inter office mail.   TRANSFER REPORT:  1910- Bedside shift change report given to Kerline Joseph RN [facility] nurse) by Ivana Sesay, ISAAC (Mable Cushing and Dr Marko Kirby, RN called Marko Kirby Halie Corley RN (oncoming nurse) by Kerline Joseph RN (offgoing nurse). Report included the following information SBAR, Kardex, Procedure Summary, Intake/Output, Recent Results and Cardiac Rhythm .

## 2024-08-21 ENCOUNTER — TELEPHONE (OUTPATIENT)
Dept: NEUROLOGY | Facility: CLINIC | Age: 34
End: 2024-08-21

## 2024-08-21 NOTE — TELEPHONE ENCOUNTER
Received drug safety consideration for Qulipta and severe hepatic dysfunction based on claim records from [employer]. Routing to provider for review.

## 2024-08-22 NOTE — TELEPHONE ENCOUNTER
Family Medical Leave Act forms received via email, sending [email or web address] to obtain Release of Information completion, logged for processing.

## 2024-08-23 NOTE — TELEPHONE ENCOUNTER
Please reach out to your PCP or GI re; hepatic labs and orders. I will defer to them for such labs ordering.

## 2024-08-26 NOTE — TELEPHONE ENCOUNTER
Type of Leave: intermittent  Reason for Leave: migraines  Start date of leave: 7/26   How much time needed?: 1-4 episodes a month lasting 1-2 days  Forms Due Date:   Was Fee and Turnaround info Given?:

## 2024-08-26 NOTE — TELEPHONE ENCOUNTER
Bruce Peraza,    This patient is requesting Family Medical Leave Act for his migraines. He states he currently gets up to 8 a month.    He is requesting 1-4 episodes a month lasting 1-2 days.    Do you support this request?    Thanks,    Yolanda

## 2024-08-28 NOTE — TELEPHONE ENCOUNTER
Please try to avoid signing forms in the corner as it is not visible when printing and forms are not accepted this way. Thank you!    Dr. Peraza,    **The ACKNOWLEDGE button has been moved to the top right ribbon**    Please sign off on form if you agree to: Family Medical Leave Act for migraines  (place your signature on the first page only)  -From your Inbasket, Highlight the patient and click Chart  -Double click the 8/15/24 Forms Completion telephone encounter  -Scroll down to the Media section  -Click the blue Hyperlink: FMMagali Peraza 8/28/24    -Click Acknowledge located in the top right ribbon/menu  -Drag the mouse into the blank space of the document and a + sign will appear. Left click to  electronically sign the document.    Thank you,    Yolanda

## 2024-11-18 ENCOUNTER — OFFICE VISIT (OUTPATIENT)
Dept: NEUROLOGY | Facility: CLINIC | Age: 34
End: 2024-11-18
Payer: COMMERCIAL

## 2024-11-18 VITALS
WEIGHT: 278 LBS | BODY MASS INDEX: 44 KG/M2 | HEART RATE: 76 BPM | DIASTOLIC BLOOD PRESSURE: 88 MMHG | SYSTOLIC BLOOD PRESSURE: 148 MMHG | RESPIRATION RATE: 16 BRPM

## 2024-11-18 DIAGNOSIS — G43.709 CHRONIC MIGRAINE W/O AURA W/O STATUS MIGRAINOSUS, NOT INTRACTABLE: Primary | ICD-10-CM

## 2024-11-18 DIAGNOSIS — G47.33 OSA (OBSTRUCTIVE SLEEP APNEA): ICD-10-CM

## 2024-11-18 PROCEDURE — 99215 OFFICE O/P EST HI 40 MIN: CPT | Performed by: OTHER

## 2024-11-18 RX ORDER — ELETRIPTAN HYDROBROMIDE 40 MG/1
TABLET, FILM COATED ORAL
Qty: 10 TABLET | Refills: 2 | Status: SHIPPED | OUTPATIENT
Start: 2024-11-18

## 2024-11-18 RX ORDER — ERGOCALCIFEROL 1.25 MG/1
CAPSULE, LIQUID FILLED ORAL
COMMUNITY
Start: 2024-08-29

## 2024-11-18 RX ORDER — MULTIVITAMIN
1 TABLET ORAL DAILY
COMMUNITY

## 2024-11-18 NOTE — PATIENT INSTRUCTIONS
Refill policies:    Allow 2-3 business days for refills; controlled substances may take longer.  Contact your pharmacy at least 5 days prior to running out of medication and have them send an electronic request or submit request through the “request refill” option in your [employer] account.  Refills are not addressed on weekends; covering physicians do not authorize routine medications on weekends.  No narcotics or controlled substances are refilled after noon on Fridays or by on call physicians.  By law, narcotics must be electronically prescribed.  A 30 day supply with no refills is the maximum allowed.  If your prescription is due for a refill, you may be due for a follow up appointment.  To best provide you care, patients receiving routine medications need to be seen at least once a year.  Patients receiving narcotic/controlled substance medications need to be seen at least once every 3 months.  In the event that your preferred pharmacy does not have the requested medication in stock (e.g. Backordered), it is your responsibility to find another pharmacy that has the requested medication available.  We will gladly send a new prescription to that pharmacy at your request.    Scheduling Tests:    If your physician has ordered radiology tests such as MRI or CT scans, please contact Central Scheduling at [phone] right away to schedule the test.  Once scheduled, the [facility] Centralized Referral Team will work with your insurance carrier to obtain pre-certification or prior authorization.  Depending on your insurance carrier, approval may take 3-10 days.  It is highly recommended patients assure they have received an authorization before having a test performed.  If test is done without insurance authorization, patient may be responsible for the entire amount billed.      Precertification and Prior Authorizations:  If your physician has recommended that you have a procedure or additional testing performed the [facility]  Centralized Referral Team will contact your insurance carrier to obtain pre-certification or prior authorization.    You are strongly encouraged to contact your insurance carrier to verify that your procedure/test has been approved and is a COVERED benefit.  Although the [facility] Centralized Referral Team does its due diligence, the insurance carrier gives the disclaimer that \"Although the procedure is authorized, this does not guarantee payment.\"    Ultimately the patient is responsible for payment.   Thank you for your understanding in this matter.  Paperwork Completion:  If you require FMLA or disability paperwork for your recovery, please make sure to either drop it off or have it faxed to our office at [phone]. Be sure the form has your name and date of birth on it.  The form will be faxed to our Forms Department and they will complete it for you.  There is a 25$ fee for all forms that need to be filled out.  Please be aware there is a 10-14 day turnaround time.  You will need to sign a release of information (JAMIE) form if your paperwork does not come with one.  You may call the Forms Department with any questions at [phone].  Their fax number is [phone].

## 2024-11-18 NOTE — PROGRESS NOTES
[facility] Neurology Outpatient Progress Note  Date of service: 11/18/2024    Assessment:     ICD-10-CM    1. Chronic migraine w/o aura w/o status migrainosus, not intractable  G43.709       2. SHONNA (obstructive sleep apnea)  G47.33         stable    Plan:  Discontinued Qulipta due to liver issue (failed topamax, muscle relaxant, ajovy, triptan)  Relpax prn  Migraine education given  Medication overuse headache education given  Recommend follow up with sleep medicine re; shonna  Ok to fill FMLA form  See orders and medications filed with this encounter. The patient indicates understanding of these issues and agrees with the plan.  Discussed with patient/family regarding assessment,  care plan   RTC 4-6 months, may need preventive meds in the future; options; aimovig/emgality, botox; would avoid gepant  Pt should go ER for any new or worsening symptoms and contact office   A total of 40 minutes were spent on patient care,  more than 50% was spent counseling patient/family regarding studies' results, assessment, treatment options and care plan, 10 minutes were spent in (pre- and/or during visit) reviewing clinical data including imaging, labs and progress notes related to therapy or treatment.    Subjective:   History:  Migraines are better, having migraines 1x/week, trying not to take anything just \"dealing with it\".  Would like to discuss an abortive medication.  Has stopped atogepant due to liver issue  Tried topamax, ajovy, triptan(which caused adverse effect).   Had MRI, MRA before, negative.  He  works as a  in [place].  History/Other:   REVIEW OF SYSTEMS:  A 10-point system was reviewed. Pertinent positives and negatives are noted as above       Current Outpatient Medications:     ergocalciferol 1.25 MG (02348 UT) Oral Cap, Take 1 capsule (50,000 units) by mouth WEEKLY, Disp: , Rfl:     Multiple Vitamin (ONE-DAILY MULTI VITAMINS) Oral Tab, Take 1 tablet by mouth daily., Disp: , Rfl:      aspirin-acetaminophen-caffeine 250-250-65 MG Oral Tab, Take 1 tablet by mouth. 2-6x per week (Patient not taking: Reported on 2024), Disp: , Rfl:     Atogepant (QULIPTA) 60 MG Oral Tab, Take 60 mg by mouth daily. (Patient not taking: Reported on 2024), Disp: 30 tablet, Rfl: 3    Eletriptan Hydrobromide (RELPAX) 40 MG Oral Tab, Take 1 tab po at onset of headache, may repeat in 2 hours if headache recurs, max 2 tabs in 24 hours (Patient not taking: Reported on 2024), Disp: 10 tablet, Rfl: 2  Allergies:  Allergies[1]  Past Medical History:    Migraines    Testicular cancer (HCC)         Past Surgical History:   Procedure Laterality Date    Anesth,nose,sinus surgery      Part of the bridge of the nose removed(7 years old)    Orchiectomy   Left 2017    Tonsillectomy       Social History:  Social History     Tobacco Use    Smoking status: Former     Current packs/day: 0.00     Types: Cigarettes     Quit date: 2022     Years since quittin.0     Passive exposure: Never    Smokeless tobacco: Former   Substance Use Topics    Alcohol use: Yes     Comment: socially     Family History   Problem Relation Age of Onset    Migraines Mother     Diabetes Maternal Grandmother     Migraines Brother      Objective:   Neurological Examination:  /88 (BP Location: Right arm, Patient Position: Sitting, Cuff Size: large)   Pulse 76   Resp 16   Wt 278 lb (126.1 kg)   BMI 43.54 kg/m²   Mental status: A & O X 3  Language: no aphasia  Speech: no dysarthria  CN II-XII: intact   Motor strength: 5/5 all extremities  Tone: normal  DTRs: 2+ symmetric  Coordination: normal  Sensory: symmetric  Gait: normal    Test reviewed on 2024      Pat \"Jensen\" MD Stu  Neurology  [facility]  2024, 10:07 AM  CC: Jorge Mack MD         [1]   Allergies  Allergen Reactions    Penicillins RASH    Sulfa Antibiotics RASH    Seasonal OTHER (SEE COMMENTS)     Sneezing, runny nose, cogestion

## 2024-11-18 NOTE — PROGRESS NOTES
Migraines are better, having migraines 1x/week, trying not to take anything just \"dealing with it\"  Would like to discuss an abortive medication.

## 2025-03-05 ENCOUNTER — HOSPITAL ENCOUNTER (EMERGENCY)
Age: 35
Discharge: HOME | End: 2025-03-05
Attending: EMERGENCY MEDICINE
Payer: COMMERCIAL

## 2025-03-05 ENCOUNTER — APPOINTMENT (OUTPATIENT)
Dept: CT IMAGING | Age: 35
End: 2025-03-05
Attending: PHYSICIAN ASSISTANT
Payer: COMMERCIAL

## 2025-03-05 VITALS
DIASTOLIC BLOOD PRESSURE: 76 MMHG | BODY MASS INDEX: 43.95 KG/M2 | WEIGHT: 280 LBS | HEIGHT: 67 IN | HEART RATE: 90 BPM | TEMPERATURE: 98 F | OXYGEN SATURATION: 100 % | RESPIRATION RATE: 18 BRPM | SYSTOLIC BLOOD PRESSURE: 139 MMHG

## 2025-03-05 DIAGNOSIS — R10.33 UMBILICAL PAIN: Primary | ICD-10-CM

## 2025-03-05 LAB
ALBUMIN SERPL-MCNC: 5.1 G/DL (ref 3.2–4.8)
ALBUMIN/GLOB SERPL: 1.6 {RATIO} (ref 1–2)
ALP LIVER SERPL-CCNC: 72 U/L
ALT SERPL-CCNC: 84 U/L
ANION GAP SERPL CALC-SCNC: 8 MMOL/L (ref 0–18)
AST SERPL-CCNC: 35 U/L (ref ?–34)
BASOPHILS # BLD AUTO: 0.06 X10(3) UL (ref 0–0.2)
BASOPHILS NFR BLD AUTO: 0.6 %
BILIRUB SERPL-MCNC: 0.4 MG/DL (ref 0.3–1.2)
BILIRUB UR QL STRIP.AUTO: NEGATIVE
BUN BLD-MCNC: 10 MG/DL (ref 9–23)
CALCIUM BLD-MCNC: 10.1 MG/DL (ref 8.7–10.6)
CHLORIDE SERPL-SCNC: 105 MMOL/L (ref 98–112)
CO2 SERPL-SCNC: 26 MMOL/L (ref 21–32)
COLOR UR AUTO: YELLOW
CREAT BLD-MCNC: 1 MG/DL
EGFRCR SERPLBLD CKD-EPI 2021: 100 ML/MIN/1.73M2 (ref 60–?)
EOSINOPHIL # BLD AUTO: 0.29 X10(3) UL (ref 0–0.7)
EOSINOPHIL NFR BLD AUTO: 3.1 %
ERYTHROCYTE [DISTWIDTH] IN BLOOD BY AUTOMATED COUNT: 13 %
GLOBULIN PLAS-MCNC: 3.1 G/DL (ref 2–3.5)
GLUCOSE BLD-MCNC: 129 MG/DL (ref 70–99)
GLUCOSE UR STRIP.AUTO-MCNC: NEGATIVE MG/DL
HCT VFR BLD AUTO: 48 %
HGB BLD-MCNC: 16.8 G/DL
IMM GRANULOCYTES # BLD AUTO: 0.03 X10(3) UL (ref 0–1)
IMM GRANULOCYTES NFR BLD: 0.3 %
KETONES UR STRIP.AUTO-MCNC: NEGATIVE MG/DL
LEUKOCYTE ESTERASE UR QL STRIP.AUTO: NEGATIVE
LIPASE SERPL-CCNC: 39 U/L (ref 12–53)
LYMPHOCYTES # BLD AUTO: 3.24 X10(3) UL (ref 1–4)
LYMPHOCYTES NFR BLD AUTO: 34.4 %
MCH RBC QN AUTO: 29.8 PG (ref 26–34)
MCHC RBC AUTO-ENTMCNC: 35 G/DL (ref 31–37)
MCV RBC AUTO: 85.1 FL
MONOCYTES # BLD AUTO: 1.09 X10(3) UL (ref 0.1–1)
MONOCYTES NFR BLD AUTO: 11.6 %
NEUTROPHILS # BLD AUTO: 4.71 X10 (3) UL (ref 1.5–7.7)
NEUTROPHILS # BLD AUTO: 4.71 X10(3) UL (ref 1.5–7.7)
NEUTROPHILS NFR BLD AUTO: 50 %
NITRITE UR QL STRIP.AUTO: NEGATIVE
OSMOLALITY SERPL CALC.SUM OF ELEC: 289 MOSM/KG (ref 275–295)
PH UR STRIP.AUTO: 7 [PH] (ref 5–8)
PLATELET # BLD AUTO: 329 10(3)UL (ref 150–450)
POTASSIUM SERPL-SCNC: 3.6 MMOL/L (ref 3.5–5.1)
PROT SERPL-MCNC: 8.2 G/DL (ref 5.7–8.2)
PROT UR STRIP.AUTO-MCNC: NEGATIVE MG/DL
RBC # BLD AUTO: 5.64 X10(6)UL
RBC UR QL AUTO: NEGATIVE
SODIUM SERPL-SCNC: 139 MMOL/L (ref 136–145)
SP GR UR STRIP.AUTO: 1.02 (ref 1–1.03)
UROBILINOGEN UR STRIP.AUTO-MCNC: 0.2 MG/DL
WBC # BLD AUTO: 9.4 X10(3) UL (ref 4–11)

## 2025-03-05 PROCEDURE — 81015 MICROSCOPIC EXAM OF URINE: CPT | Performed by: PHYSICIAN ASSISTANT

## 2025-03-05 PROCEDURE — 74177 CT ABD & PELVIS W/CONTRAST: CPT | Performed by: PHYSICIAN ASSISTANT

## 2025-03-05 PROCEDURE — 96361 HYDRATE IV INFUSION ADD-ON: CPT

## 2025-03-05 PROCEDURE — 85025 COMPLETE CBC W/AUTO DIFF WBC: CPT | Performed by: EMERGENCY MEDICINE

## 2025-03-05 PROCEDURE — 96360 HYDRATION IV INFUSION INIT: CPT

## 2025-03-05 PROCEDURE — 99284 EMERGENCY DEPT VISIT MOD MDM: CPT

## 2025-03-05 PROCEDURE — 80053 COMPREHEN METABOLIC PANEL: CPT | Performed by: EMERGENCY MEDICINE

## 2025-03-05 PROCEDURE — 99285 EMERGENCY DEPT VISIT HI MDM: CPT

## 2025-03-05 PROCEDURE — 81001 URINALYSIS AUTO W/SCOPE: CPT | Performed by: PHYSICIAN ASSISTANT

## 2025-03-05 PROCEDURE — 83690 ASSAY OF LIPASE: CPT | Performed by: PHYSICIAN ASSISTANT

## 2025-03-05 NOTE — ED PROVIDER NOTES
Patient Seen in: [facility] [place]      History     Chief Complaint   Patient presents with    Abdomen/Flank Pain     Stated Complaint: Has been having dull pain mid right side since Friday.Radiats to the lower back*    Subjective:   HPI      34-year-old male.  Medical history of migraine type headaches and testicular cancer.  Obesity.  Patient has had a pain to his right umbilical region with occasional radiation to the right flank for the past 5 days.  He was seen for this complaint at [facility] yesterday evening.  Patient had blood work and urine performed with no acute abnormalities.  Discharged without imaging.  Pain persists.  He has been somewhat constipated recently.  Denies any urinary complaints.  No fever.  No chest pain or shortness of breath.  No history of renal calculi.  Denies any testicular pain.    Objective:     Past Medical History:    Migraines    Testicular cancer (HCC)                  Past Surgical History:   Procedure Laterality Date    Anesth,nose,sinus surgery      Part of the bridge of the nose removed(7 years old)    Orchiectomy   Left 2017    Tonsillectomy                  Social History     Socioeconomic History    Marital status: Single   Tobacco Use    Smoking status: Former     Current packs/day: 0.00     Types: Cigarettes     Quit date: 2022     Years since quittin.3     Passive exposure: Never    Smokeless tobacco: Former   Vaping Use    Vaping status: Never Used   Substance and Sexual Activity    Alcohol use: Yes     Comment: socially    Drug use: Yes     Types: Cannabis     Comment: Edibles for migraines occasionally   Other Topics Concern    Caffeine Concern Yes     Comment: 1 coffee per day, 1 energy drink and 2-3 cokes per day    Exercise No                  Physical Exam     ED Triage Vitals [25 1736]   /82   Pulse 104   Resp 18   Temp 98.2 °F (36.8 °C)   Temp src Temporal   SpO2 97 %   O2 Device None (Room air)        Current Vitals:   Vital Signs  BP: 148/80  Pulse: 99  Resp: 18  Temp: 98.2 °F (36.8 °C)  Temp src: Temporal    Oxygen Therapy  SpO2: 100 %  O2 Device: None (Room air)        Physical Exam     Gen: Well appearing, well groomed, alert and aware x 3  Neck: Supple, full range of motion, no thyromegaly or lymphadenopathy.  Abdominal: Soft exam without distention.  No pain to palpation all 4 quadrants. No organomegaly.  Normal bowel sounds.  Back: Full active range of motion.  No CVA tenderness bilaterally.  Lung: No distress, RR, no retraction, breath sounds are clear bilaterally  Cardio: Regular rate and rhythm, normal S1-S2, no murmur appreciable  ED Course     Labs Reviewed   URINALYSIS, ROUTINE - Abnormal; Notable for the following components:       Result Value    Clarity Urine Cloudy (*)     All other components within normal limits   COMP METABOLIC PANEL (14) - Abnormal; Notable for the following components:    Glucose 129 (*)     AST 35 (*)     ALT 84 (*)     Albumin 5.1 (*)     All other components within normal limits   CBC WITH DIFFERENTIAL WITH PLATELET - Abnormal; Notable for the following components:    Monocyte Absolute 1.09 (*)     All other components within normal limits   LIPASE - Normal   UA MICROSCOPIC ONLY, URINE                 MDM          Patient's labs from yesterday were reviewed.  No leukocytosis.  Stable hemoglobin    CO2 slightly low.  LFTs mildly elevated.  This has been an ongoing issue per patient.    A lipase was not drawn.  Urinalysis was not performed.    Fluid bolus initiated.  Patient sent for CT of the abdomen pelvis.  We collected blood for a repeat labs including lipase and urinalysis    CBC demonstrates no leukocytosis.  Stable hemoglobin    CMP demonstrates a glucose of 129, AST of 35 and ALT of 84    Lipase within normal limits    Impression:    CONCLUSION:    1. A specific etiology for abdominal pain is not evident from this study.  2. There is no acute abnormality  detected.                Urinalysis benign    [facility] Colonoscopy versus ERCP/HIDA scan    Medical Decision Making      Disposition and Plan     Clinical Impression:  1. Umbilical pain         Disposition:  There is no disposition on file for this visit.  There is no disposition time on file for this visit.    Follow-up:  Husam Velasquez MD  [address] [address], [facility] [address], [facility]  [facility] [address]  [phone]    Follow up            Medications Prescribed:  Current Discharge Medication List              Supplementary Documentation:

## 2025-03-05 NOTE — ED INITIAL ASSESSMENT (HPI)
Right sided abdominal pain since Friday. Pt denies vomiting, mild nausea, no diarrhea. Pain occasionally radiates to right flank

## 2025-03-06 NOTE — DISCHARGE INSTRUCTIONS
Call the provided GI group to arrange follow-up and further evaluation.  Return for any acute worsening

## 2025-05-06 ENCOUNTER — OFFICE VISIT (OUTPATIENT)
Dept: NEUROLOGY | Facility: CLINIC | Age: 35
End: 2025-05-06
Payer: COMMERCIAL

## 2025-05-06 VITALS
WEIGHT: 280 LBS | DIASTOLIC BLOOD PRESSURE: 68 MMHG | RESPIRATION RATE: 16 BRPM | BODY MASS INDEX: 44 KG/M2 | HEART RATE: 92 BPM | SYSTOLIC BLOOD PRESSURE: 122 MMHG

## 2025-05-06 DIAGNOSIS — G47.33 OSA (OBSTRUCTIVE SLEEP APNEA): Primary | ICD-10-CM

## 2025-05-06 DIAGNOSIS — G43.709 CHRONIC MIGRAINE W/O AURA W/O STATUS MIGRAINOSUS, NOT INTRACTABLE: ICD-10-CM

## 2025-05-06 PROCEDURE — 99215 OFFICE O/P EST HI 40 MIN: CPT | Performed by: OTHER

## 2025-05-06 RX ORDER — ELETRIPTAN HYDROBROMIDE 40 MG/1
TABLET, FILM COATED ORAL
Qty: 10 TABLET | Refills: 2 | Status: SHIPPED | OUTPATIENT
Start: 2025-05-06

## 2025-05-06 RX ORDER — ELETRIPTAN HYDROBROMIDE 40 MG/1
TABLET, FILM COATED ORAL
Qty: 10 TABLET | Refills: 2 | Status: SHIPPED | OUTPATIENT
Start: 2025-05-06 | End: 2025-05-06

## 2025-05-06 RX ORDER — TRAZODONE HYDROCHLORIDE 50 MG/1
TABLET ORAL
COMMUNITY
Start: 2025-03-10

## 2025-05-06 NOTE — PROGRESS NOTES
[facility] Neurology Outpatient Progress Note  Date of service: 5/6/2025  Assessment & Plan      ICD-10-CM    1. SHONNA (obstructive sleep apnea)  G47.33       2. Chronic migraine w/o aura w/o status migrainosus, not intractable  G43.709 Eletriptan Hydrobromide (RELPAX) 40 MG Oral Tab     DISCONTINUED: Eletriptan Hydrobromide (RELPAX) 40 MG Oral Tab      Stable    Chronic migraine without aura  Migraines well-managed with eletriptan, reduced frequency to one per month. Intensity decreased. Seasonal changes trigger migraines. Preventive medication considered if frequency exceeds five to eight days per month.  - Prescribe eletriptan as needed for acute attacks.  - Advise maintaining headache journal for frequency and intensity.  - Discuss preventive medication if frequency exceeds eight days per month.  - Complete [facility] paperwork contingent on visits every three to four months.    Obstructive sleep apnea  Self Discontinued CPAP due to cost. Focus on weight loss and physical activity. Discussed long-term risks of untreated apnea, including cardiovascular complications. Encouraged exploring affordable CPAP options if lifestyle changes insufficient.  - Encourage weight loss and increased physical activity.  - Advise monitoring sleep quality and symptoms.  - Recommend consultation with sleep specialist if symptoms persist or worsen to explore affordable CPAP options.    Previously Discontinued Qulipta due to liver issue (failed topamax, muscle relaxant, ajovy, triptan)  Relpax prn  See orders and medications filed with this encounter. The patient indicates understanding of these issues and agrees with the plan.  Discussed with patient/family regarding assessment,  care plan   RTC 3- 4 months, may need preventive meds in the future; options; aimovig/emgality, botox; would avoid gepant  Pt should go ER for any new or worsening symptoms and contact office   A total of 40 minutes were spent on patient care,  more than 50% was spent  counseling patient/family regarding studies' results, assessment, treatment options and care plan, 10 minutes were spent in (pre- and/or during visit) reviewing clinical data including imaging, labs and progress notes related to therapy or treatment    Subjective:   History of Present Illness  Yeison Rodriguez is a 34 year old male with migraines who presents for follow-up of his headache management.    His migraines have been improving, with the frequency reduced to about one migraine per month, occasionally two. The intensity of the migraines has also decreased compared to previous episodes. He attributes some of the improvement to lifestyle changes, including increased physical activity and hydration. He uses eletriptan as needed for migraine relief and currently has a supply at home. He is unsure if he needs a refill but wants to ensure availability at his pharmacy. He is maintaining a headache journal to monitor the frequency and severity of his migraines.    He experiences more headaches during the fall and winter seasons, with the beginning of spring also being a trigger due to allergies. Once he acclimates to the seasonal changes, his symptoms improve.    He previously used a CPAP machine for sleep apnea but self discontinued it due to high costs. He is currently focusing on weight loss and lifestyle modifications to manage his sleep apnea.  Has stopped atogepant due to liver issue  Tried topamax, ajovy, triptan(which caused adverse effect).   Had MRI, MRA before, negative.  He  works as a  in [place].    History/Other:   REVIEW OF SYSTEMS:  A 10-point system was reviewed. Pertinent positives and negatives are noted as above     Medications - Current[1]  Allergies:  Allergies[2]  Past Medical History[3]  Past Surgical History[4]  Social History:  Social History     Tobacco Use    Smoking status: Former     Current packs/day: 0.00     Types: Cigarettes     Quit date: 11/7/2022     Years since  quittin.4     Passive exposure: Never    Smokeless tobacco: Former   Substance Use Topics    Alcohol use: Yes     Comment: socially     Family History[5]   Patient denies any pertinent family history associated with the current problem    Objective:   Neurological Examination:  /68   Pulse 92   Resp 16   Wt 280 lb (127 kg)   BMI 43.85 kg/m²   Mental status: A & O X 3  Language: no aphasia  Speech: no dysarthria  CN II-XII: intact   Motor strength: 5/5 all extremities  Tone: normal  Coordination: normal  Sensory: symmetric  Gait: normal    Test reviewed on 2025      Pat Peraza MD  Neurology  [facility]  2025, 9:21 AM  CC: Jorge Mack MD       [1]   Current Outpatient Medications:     traZODone 50 MG Oral Tab, Take 1 tablet (50 mg) by mouth nightly as needed for Insomnia, Disp: , Rfl:     Eletriptan Hydrobromide (RELPAX) 40 MG Oral Tab, Take 1 tab po at onset of headache, may repeat in 2 hours if headache recurs, max 2 tabs in 24 hours, Disp: 10 tablet, Rfl: 2    Multiple Vitamin (ONE-DAILY MULTI VITAMINS) Oral Tab, Take 1 tablet by mouth daily., Disp: , Rfl:     aspirin-acetaminophen-caffeine 250-250-65 MG Oral Tab, Take 1 tablet by mouth. 2-6x per week (Patient not taking: Reported on 2025), Disp: , Rfl:   [2]   Allergies  Allergen Reactions    Penicillins RASH    Sulfa Antibiotics RASH    Seasonal OTHER (SEE COMMENTS)     Sneezing, runny nose, cogestion   [3]   Past Medical History:   Migraines    Testicular cancer (HCC)    2017   [4]   Past Surgical History:  Procedure Laterality Date    Anesth,nose,sinus surgery      Part of the bridge of the nose removed(7 years old)    Orchiectomy   Left 2017    Tonsillectomy     [5]   Family History  Problem Relation Age of Onset    Migraines Mother     Diabetes Maternal Grandmother     Migraines Brother

## 2025-05-06 NOTE — PATIENT INSTRUCTIONS
Refill policies:    Allow 2-3 business days for refills; controlled substances may take longer.  Contact your pharmacy at least 5 days prior to running out of medication and have them send an electronic request or submit request through the “request refill” option in your [employer] account.  Refills are not addressed on weekends; covering physicians do not authorize routine medications on weekends.  No narcotics or controlled substances are refilled after noon on Fridays or by on call physicians.  By law, narcotics must be electronically prescribed.  A 30 day supply with no refills is the maximum allowed.  If your prescription is due for a refill, you may be due for a follow up appointment.  To best provide you care, patients receiving routine medications need to be seen at least once a year.  Patients receiving narcotic/controlled substance medications need to be seen at least once every 3 months.  In the event that your preferred pharmacy does not have the requested medication in stock (e.g. Backordered), it is your responsibility to find another pharmacy that has the requested medication available.  We will gladly send a new prescription to that pharmacy at your request.    Scheduling Tests:    If your physician has ordered radiology tests such as MRI or CT scans, please contact Central Scheduling at [phone] right away to schedule the test.  Once scheduled, the [facility] Centralized Referral Team will work with your insurance carrier to obtain pre-certification or prior authorization.  Depending on your insurance carrier, approval may take 3-10 days.  It is highly recommended patients assure they have received an authorization before having a test performed.  If test is done without insurance authorization, patient may be responsible for the entire amount billed.      Precertification and Prior Authorizations:  If your physician has recommended that you have a procedure or additional testing performed the [facility]  Centralized Referral Team will contact your insurance carrier to obtain pre-certification or prior authorization.    You are strongly encouraged to contact your insurance carrier to verify that your procedure/test has been approved and is a COVERED benefit.  Although the [facility] Centralized Referral Team does its due diligence, the insurance carrier gives the disclaimer that \"Although the procedure is authorized, this does not guarantee payment.\"    Ultimately the patient is responsible for payment.   Thank you for your understanding in this matter.  Paperwork Completion:  If you require FMLA or disability paperwork for your recovery, please make sure to either drop it off or have it faxed to our office at [phone]. Be sure the form has your name and date of birth on it.  The form will be faxed to our Forms Department and they will complete it for you.  There is a 25$ fee for all forms that need to be filled out.  Please be aware there is a 10-14 day turnaround time.  You will need to sign a release of information (JAMIE) form if your paperwork does not come with one.  You may call the Forms Department with any questions at [phone].  Their fax number is [phone].

## 2025-05-16 ENCOUNTER — TELEPHONE (OUTPATIENT)
Dept: NEUROLOGY | Facility: CLINIC | Age: 35
End: 2025-05-16

## 2025-05-16 NOTE — TELEPHONE ENCOUNTER
Received Family Medical Leave Act forms in the forms department via [employer] message. Sent Release of Information, [employer] software. Created e-mail and Logged for processing.

## 2025-05-27 NOTE — TELEPHONE ENCOUNTER
Called patient and details were provided.    Type of Leave: intermittent (re cert)   Reason for Leave: migraines   Start date of leave: 7/27/25  End date of leave: 7/27/26  How many flare ups per month/length?: 1-4 flares a month, lasting 1-2 days  How many appts per month/length?: 1-2 appointments every 6mtnhs, lasting 1-4 hours   Was Fee and Turnaround info Given?: Yes

## 2025-05-29 NOTE — TELEPHONE ENCOUNTER
Called patient to inform the incorrect forms were sent. Disability forms were received - Family Medical Leave Act forms are the correct forms. Patient stated he will send correct forms via [employer].

## 2025-05-29 NOTE — TELEPHONE ENCOUNTER
Called and informed patient of changes Dr. Peraza requested for form processing. Patient verbalized understanding.

## 2025-05-29 NOTE — TELEPHONE ENCOUNTER
Dr. Peraza,    Forms updated with corrections you requested.    Dr. Peraza    Please sign off on form if you agree to: Intermittent Family Medical Leave Act (corrected) 7/27/25 -1/27/26    -Signature page will be the first page scanned  -From your Inbasket, Highlight the patient and click Chart   -Double click the 5/16/25 Forms Completion telephone encounter  -Scroll down to the Media section   -Click the blue Hyperlink: Family Medical Leave Act (corrected)  Dr. Peraza  5/29/25  -Click Acknowledge located in the top right ribbon/menu   -Drag the mouse into the blank space of the document and a + sign will appear. Left click to   electronically sign the document.  -Once signed, simply exit out of the screen and you signature will be saved.     Thank you,    Roby LAMB

## 2025-05-29 NOTE — TELEPHONE ENCOUNTER
Dr. Peraza    Please sign off on form if you agree to: Intermittent Family Medical Leave Act  (Re Cert) for Migraines  7/27/25 -7/27/26    -Signature page will be the first page scanned  -From your Inbasket, Highlight the patient and click Chart   -Double click the 5/16/25 Forms Completion telephone encounter  -Scroll down to the Media section   -Click the blue Hyperlink: Family Medical Leave Act   Dr. Peraza  5/29/25  -Click Acknowledge located in the top right ribbon/menu   -Drag the mouse into the blank space of the document and a + sign will appear. Left click to   electronically sign the document.  -Once signed, simply exit out of the screen and you signature will be saved.     Thank you,    Roby LAMB

## 2025-06-02 NOTE — TELEPHONE ENCOUNTER
Family Medical Leave Act form completed and signed by provider. Faxed to [employer] [phone] confirmation received.